# Patient Record
Sex: MALE | Race: WHITE | NOT HISPANIC OR LATINO | URBAN - NONMETROPOLITAN AREA
[De-identification: names, ages, dates, MRNs, and addresses within clinical notes are randomized per-mention and may not be internally consistent; named-entity substitution may affect disease eponyms.]

---

## 2017-01-30 ENCOUNTER — TRANSCRIBE ORDERS (OUTPATIENT)
Dept: ADMINISTRATIVE | Facility: HOSPITAL | Age: 22
End: 2017-01-30

## 2017-01-30 DIAGNOSIS — R10.11 RIGHT UPPER QUADRANT PAIN: Primary | ICD-10-CM

## 2017-09-06 ENCOUNTER — OFFICE VISIT (OUTPATIENT)
Dept: RETAIL CLINIC | Facility: CLINIC | Age: 22
End: 2017-09-06

## 2017-09-06 VITALS — RESPIRATION RATE: 18 BRPM | TEMPERATURE: 97.9 F | OXYGEN SATURATION: 98 % | WEIGHT: 315 LBS | HEART RATE: 70 BPM

## 2017-09-06 DIAGNOSIS — H66.90 EAR INFECTION: Primary | ICD-10-CM

## 2017-09-06 PROBLEM — H66.93 BILATERAL OTITIS MEDIA: Status: ACTIVE | Noted: 2017-09-06

## 2017-09-06 PROCEDURE — 99203 OFFICE O/P NEW LOW 30 MIN: CPT | Performed by: NURSE PRACTITIONER

## 2017-09-06 RX ORDER — ACETAMINOPHEN 500 MG
500 TABLET ORAL EVERY 6 HOURS PRN
COMMUNITY
End: 2017-12-05

## 2017-09-06 RX ORDER — AZELASTINE 1 MG/ML
1 SPRAY, METERED NASAL 2 TIMES DAILY
Qty: 1 EACH | Refills: 0 | Status: SHIPPED | OUTPATIENT
Start: 2017-09-06 | End: 2017-12-05

## 2017-09-06 RX ORDER — AMOXICILLIN AND CLAVULANATE POTASSIUM 875; 125 MG/1; MG/1
1 TABLET, FILM COATED ORAL 2 TIMES DAILY
Qty: 20 TABLET | Refills: 0 | Status: SHIPPED | OUTPATIENT
Start: 2017-09-06 | End: 2017-09-16

## 2017-09-06 NOTE — PATIENT INSTRUCTIONS

## 2017-09-06 NOTE — PROGRESS NOTES
HPI Comments: Pilo (Boyd) presents to the clinic today c/o upper respiratory symptoms which started two to three days ago.  Associated symptoms include nasal congestion/drainage, ear pain/pressure, headache, and sinus pressure. He has tried Tylenol  with mild relief. Refer to ROS for additional information.    URI    This is a new problem. The current episode started yesterday. The problem has been rapidly worsening. Maximum temperature: Unmeasured. Associated symptoms include congestion, coughing, ear pain, headaches, a plugged ear sensation, rhinorrhea and sinus pain. Pertinent negatives include no nausea, rash, sneezing, sore throat, swollen glands, vomiting or wheezing. He has tried acetaminophen for the symptoms. The treatment provided mild relief.      Vitals:    09/06/17 1902   Pulse: 70   Resp: 18   Temp: 97.9 °F (36.6 °C)   SpO2: 98%   Weight: (!) 370 lb 6.4 oz (168 kg)      The following portions of the patient's history were reviewed and updated as appropriate: allergies, current medications, past family history, past medical history, past social history, past surgical history and problem list.    Review of Systems   Constitutional: Positive for fatigue. Negative for appetite change and chills.   HENT: Positive for congestion, ear pain, postnasal drip, rhinorrhea and sinus pressure. Negative for sneezing and sore throat.    Eyes: Negative for discharge and redness.   Respiratory: Positive for cough. Negative for chest tightness, shortness of breath and wheezing.    Gastrointestinal: Negative for nausea and vomiting.   Musculoskeletal: Negative for neck stiffness.   Skin: Negative for rash.   Neurological: Positive for headaches.   Hematological: Negative for adenopathy.   Psychiatric/Behavioral: Negative for sleep disturbance.   All other systems reviewed and are negative.    Physical Exam   Constitutional: He is oriented to person, place, and time. He appears well-developed and well-nourished. No  distress.   HENT:   Head: Normocephalic.   Right Ear: Ear canal normal. No mastoid tenderness. Tympanic membrane is erythematous and bulging. A middle ear effusion is present.   Left Ear: Ear canal normal. No mastoid tenderness. Tympanic membrane is erythematous and bulging. A middle ear effusion is present.   Nose: Mucosal edema, rhinorrhea and sinus tenderness present. Right sinus exhibits frontal sinus tenderness. Right sinus exhibits no maxillary sinus tenderness. Left sinus exhibits frontal sinus tenderness. Left sinus exhibits no maxillary sinus tenderness.   Mouth/Throat: Oropharyngeal exudate (Mucoid) and posterior oropharyngeal erythema present.   Eyes: Conjunctivae are normal. Pupils are equal, round, and reactive to light. Right eye exhibits no discharge. Left eye exhibits no discharge.   Neck: Neck supple. No rigidity.   Cardiovascular: Normal rate, regular rhythm and normal heart sounds.  Exam reveals no friction rub.    No murmur heard.  Pulmonary/Chest: Effort normal. No respiratory distress. He has no decreased breath sounds. He has no wheezes. He has no rhonchi. He has no rales.   Lymphadenopathy:        Head (right side): No preauricular and no posterior auricular adenopathy present.        Head (left side): No preauricular and no posterior auricular adenopathy present.     He has no cervical adenopathy.   Neurological: He is alert and oriented to person, place, and time.   Skin: Skin is warm and dry. No rash noted. No erythema.   Psychiatric: He has a normal mood and affect. His behavior is normal. Judgment and thought content normal.   Vitals reviewed.    Assessment/Plan   Problems Addressed this Visit     None      Visit Diagnoses     Ear infection    -  Primary    Findings and recommendations discussed with Boyd. Treatment options reviewed. He reports generally he does the best with Augmentin with these symptoms.     Relevant Medications    amoxicillin-clavulanate (AUGMENTIN) 875-125 MG per  tablet    azelastine (ASTELIN) 0.1 % nasal spray      Findings and recommendations discussed with Boyd. Treatment options reviewed. He reports generally he does the best with Augmentin with these symptoms. Counseled regarding supportive care measures. Encouraged him to seek further medical evaluation if symptoms worsen or do not improve within 48-72 hours.

## 2017-12-05 ENCOUNTER — OFFICE VISIT (OUTPATIENT)
Dept: RETAIL CLINIC | Facility: CLINIC | Age: 22
End: 2017-12-05

## 2017-12-05 VITALS — RESPIRATION RATE: 18 BRPM | OXYGEN SATURATION: 97 % | WEIGHT: 315 LBS | TEMPERATURE: 98.6 F | HEART RATE: 90 BPM

## 2017-12-05 DIAGNOSIS — J01.10 ACUTE FRONTAL SINUSITIS, RECURRENCE NOT SPECIFIED: Primary | ICD-10-CM

## 2017-12-05 DIAGNOSIS — J40 BRONCHITIS: ICD-10-CM

## 2017-12-05 PROCEDURE — 99213 OFFICE O/P EST LOW 20 MIN: CPT | Performed by: NURSE PRACTITIONER

## 2017-12-05 RX ORDER — ALBUTEROL SULFATE 90 UG/1
2 AEROSOL, METERED RESPIRATORY (INHALATION) EVERY 4 HOURS PRN
Qty: 1 INHALER | Refills: 0 | Status: SHIPPED | OUTPATIENT
Start: 2017-12-05

## 2017-12-05 RX ORDER — AZELASTINE 1 MG/ML
1 SPRAY, METERED NASAL 2 TIMES DAILY
Qty: 1 EACH | Refills: 0 | Status: SHIPPED | OUTPATIENT
Start: 2017-12-05

## 2017-12-05 RX ORDER — PREDNISONE 10 MG/1
20 TABLET ORAL 2 TIMES DAILY
Qty: 20 TABLET | Refills: 0 | Status: SHIPPED | OUTPATIENT
Start: 2017-12-05

## 2017-12-05 RX ORDER — DOXYCYCLINE HYCLATE 100 MG/1
100 CAPSULE ORAL 2 TIMES DAILY
Qty: 20 CAPSULE | Refills: 0 | Status: SHIPPED | OUTPATIENT
Start: 2017-12-05 | End: 2017-12-15

## 2017-12-05 NOTE — PROGRESS NOTES
HPI Comments: Pilo presents to the clinic today c/o upper respiratory symptoms which started appx one week  ago. Associated symptoms include sinus pressure/facial pain, ear pressure, nonproductive cough with associated wheezing. He has tried otc Excedrin andTylenol without adequate relief. Refer to ROS for additional information.    URI    This is a new problem. The current episode started in the past 7 days. The problem has been gradually worsening. Associated symptoms include congestion, coughing, headaches, a plugged ear sensation, rhinorrhea, sinus pain, a sore throat and wheezing. Pertinent negatives include no ear pain, nausea, rash, swollen glands or vomiting. He has tried nothing for the symptoms.      Vitals:    12/05/17 1502   Pulse: 90   Resp: 18   Temp: 98.6 °F (37 °C)   TempSrc: Oral   SpO2: 97%   Weight: (!) 176 kg (388 lb 3.2 oz)      The following portions of the patient's history were reviewed and updated as appropriate: allergies, current medications, past family history, past medical history, past social history, past surgical history and problem list.    Review of Systems   Constitutional: Positive for activity change, chills and fatigue. Negative for appetite change. Fever: Maybe.   HENT: Positive for congestion, postnasal drip, rhinorrhea, sinus pain, sinus pressure and sore throat. Negative for ear pain.    Eyes: Negative for discharge and redness.   Respiratory: Positive for cough, chest tightness and wheezing. Negative for shortness of breath.    Gastrointestinal: Negative for nausea and vomiting.   Musculoskeletal: Negative for neck stiffness.   Skin: Negative for color change and rash.   Neurological: Positive for headaches. Negative for dizziness and light-headedness.   Hematological: Negative for adenopathy.   All other systems reviewed and are negative.    Physical Exam   Constitutional: He is oriented to person, place, and time. He appears well-developed and well-nourished. No  distress.   HENT:   Head: Normocephalic.   Right Ear: Ear canal normal. No mastoid tenderness. Tympanic membrane is bulging. Tympanic membrane is not erythematous. A middle ear effusion is present.   Left Ear: Ear canal normal. No mastoid tenderness. Tympanic membrane is bulging. Tympanic membrane is not erythematous. A middle ear effusion is present.   Nose: Mucosal edema, rhinorrhea and sinus tenderness present. Right sinus exhibits frontal sinus tenderness. Right sinus exhibits no maxillary sinus tenderness. Left sinus exhibits frontal sinus tenderness. Left sinus exhibits no maxillary sinus tenderness.   Mouth/Throat: Oropharyngeal exudate (PND) and posterior oropharyngeal erythema present.   Eyes: Conjunctivae are normal. Pupils are equal, round, and reactive to light. No scleral icterus.   Neck: Neck supple. No rigidity.   Cardiovascular: Normal rate, regular rhythm and normal heart sounds.  Exam reveals no friction rub.    No murmur heard.  Pulmonary/Chest: Effort normal and breath sounds normal. No respiratory distress. He has no decreased breath sounds. He has no wheezes. He has no rhonchi. He has no rales.   Musculoskeletal: He exhibits no edema or tenderness.   Lymphadenopathy:     He has no cervical adenopathy.   Neurological: He is alert and oriented to person, place, and time.   Skin: Skin is warm and dry. No rash noted. No erythema.   Vitals reviewed.    Assessment/Plan   Problems Addressed this Visit        Respiratory    Acute frontal sinusitis - Primary    Relevant Medications    doxycycline (VIBRAMYCIN) 100 MG capsule    azelastine (ASTELIN) 0.1 % nasal spray    Bronchitis    Relevant Medications    predniSONE (DELTASONE) 10 MG tablet    azelastine (ASTELIN) 0.1 % nasal spray    albuterol (PROVENTIL HFA;VENTOLIN HFA) 108 (90 Base) MCG/ACT inhaler        Findings and recommendations discussed with Pilo. Treatment options reviewed. Counseled regarding supportive care measures. Encouraged Pilo  to seek further medica evaluation if symptoms worsen or do not improve within 48-72 hours.

## 2017-12-05 NOTE — PATIENT INSTRUCTIONS
Acute Bronchitis  Bronchitis is when the airways that extend from the windpipe into the lungs get red, puffy, and painful (inflamed). Bronchitis often causes thick spit (mucus) to develop. This leads to a cough. A cough is the most common symptom of bronchitis.  In acute bronchitis, the condition usually begins suddenly and goes away over time (usually in 2 weeks). Smoking, allergies, and asthma can make bronchitis worse. Repeated episodes of bronchitis may cause more lung problems.  HOME CARE  · Rest.  · Drink enough fluids to keep your pee (urine) clear or pale yellow (unless you need to limit fluids as told by your doctor).  · Only take over-the-counter or prescription medicines as told by your doctor.  · Avoid smoking and secondhand smoke. These can make bronchitis worse. If you are a smoker, think about using nicotine gum or skin patches. Quitting smoking will help your lungs heal faster.  · Reduce the chance of getting bronchitis again by:    Washing your hands often.    Avoiding people with cold symptoms.    Trying not to touch your hands to your mouth, nose, or eyes.  · Follow up with your doctor as told.  GET HELP IF:  Your symptoms do not improve after 1 week of treatment. Symptoms include:  · Cough.  · Fever.  · Coughing up thick spit.  · Body aches.  · Chest congestion.  · Chills.  · Shortness of breath.  · Sore throat.  GET HELP RIGHT AWAY IF:   · You have an increased fever.  · You have chills.  · You have severe shortness of breath.  · You have bloody thick spit (sputum).  · You throw up (vomit) often.  · You lose too much body fluid (dehydration).  · You have a severe headache.  · You faint.  MAKE SURE YOU:   · Understand these instructions.  · Will watch your condition.  · Will get help right away if you are not doing well or get worse.     This information is not intended to replace advice given to you by your health care provider. Make sure you discuss any questions you have with your health care  provider.     Document Released: 06/05/2009 Document Revised: 08/20/2014 Document Reviewed: 06/10/2014  [employer] Interactive Patient Education ©2017 [employer]  Sinusitis, Adult  Sinusitis is soreness and inflammation of your sinuses. Sinuses are hollow spaces in the bones around your face. They are located:  · Around your eyes.  · In the middle of your forehead.  · Behind your nose.  · In your cheekbones.  Your sinuses and nasal passages are lined with a stringy fluid (mucus). Mucus normally drains out of your sinuses. When your nasal tissues get inflamed or swollen, the mucus can get trapped or blocked so air cannot flow through your sinuses. This lets bacteria, viruses, and funguses grow, and that leads to infection.  HOME CARE  Medicines  · Take, use, or apply over-the-counter and prescription medicines only as told by your doctor. These may include nasal sprays.  · If you were prescribed an antibiotic medicine, take it as told by your doctor. Do not stop taking the antibiotic even if you start to feel better.  Hydrate and Humidify  · Drink enough water to keep your pee (urine) clear or pale yellow.  · Use a cool mist humidifier to keep the humidity level in your home above 50%.  · Breathe in steam for 10-15 minutes, 3-4 times a day or as told by your doctor. You can do this in the bathroom while a hot shower is running.  · Try not to spend time in cool or dry air.  Rest  · Rest as much as possible.    · Sleep with your head raised (elevated).  · Make sure to get enough sleep each night.  General Instructions  · Put a warm, moist washcloth on your face 3-4 times a day or as told by your doctor. This will help with discomfort.  · Wash your hands often with soap and water. If there is no soap and water, use hand .  · Do not smoke. Avoid being around people who are smoking (secondhand smoke).  · Keep all follow-up visits as told by your doctor. This is important.  GET HELP IF:  · You have a  fever.  · Your symptoms get worse.  · Your symptoms do not get better within 10 days.  GET HELP RIGHT AWAY IF:  · You have a very bad headache.  · You cannot stop throwing up (vomiting).  · You have pain or swelling around your face or eyes.  · You have trouble seeing.  · You feel confused.  · Your neck is stiff.  · You have trouble breathing.     This information is not intended to replace advice given to you by your health care provider. Make sure you discuss any questions you have with your health care provider.     Document Released: 06/05/2009 Document Revised: 04/10/2017 Document Reviewed: 10/12/2016  [email or web address] Interactive Patient Education ©2017 [employer]

## 2021-03-18 ENCOUNTER — BULK ORDERING (OUTPATIENT)
Dept: CASE MANAGEMENT | Facility: OTHER | Age: 26
End: 2021-03-18

## 2021-03-18 DIAGNOSIS — Z23 IMMUNIZATION DUE: ICD-10-CM

## 2024-01-10 ENCOUNTER — HOSPITAL ENCOUNTER (OUTPATIENT)
Dept: GENERAL RADIOLOGY | Facility: HOSPITAL | Age: 29
Discharge: HOME | End: 2024-01-10
Admitting: PHYSICIAN ASSISTANT
Payer: COMMERCIAL

## 2024-01-10 ENCOUNTER — TRANSCRIBE ORDERS (OUTPATIENT)
Dept: ADMINISTRATIVE | Facility: HOSPITAL | Age: 29
End: 2024-01-10
Payer: COMMERCIAL

## 2024-01-10 DIAGNOSIS — J06.9 ACUTE UPPER RESPIRATORY INFECTION: Primary | ICD-10-CM

## 2024-01-10 DIAGNOSIS — J06.9 ACUTE UPPER RESPIRATORY INFECTION: ICD-10-CM

## 2024-01-10 PROCEDURE — 71046 X-RAY EXAM CHEST 2 VIEWS: CPT
